# Patient Record
Sex: FEMALE | Race: WHITE
[De-identification: names, ages, dates, MRNs, and addresses within clinical notes are randomized per-mention and may not be internally consistent; named-entity substitution may affect disease eponyms.]

---

## 2019-03-22 ENCOUNTER — HOSPITAL ENCOUNTER (EMERGENCY)
Dept: HOSPITAL 91 - FTE | Age: 19
Discharge: ANOTHER HOSPITAL | End: 2019-03-22
Payer: COMMERCIAL

## 2019-03-22 ENCOUNTER — HOSPITAL ENCOUNTER (EMERGENCY)
Dept: HOSPITAL 10 - FTE | Age: 19
Discharge: ANOTHER HOSPITAL | End: 2019-03-22
Payer: COMMERCIAL

## 2019-03-22 VITALS
WEIGHT: 157.41 LBS | BODY MASS INDEX: 25.3 KG/M2 | HEIGHT: 66 IN | WEIGHT: 157.41 LBS | HEIGHT: 66 IN | BODY MASS INDEX: 25.3 KG/M2

## 2019-03-22 VITALS — SYSTOLIC BLOOD PRESSURE: 113 MMHG | RESPIRATION RATE: 18 BRPM | HEART RATE: 90 BPM | DIASTOLIC BLOOD PRESSURE: 71 MMHG

## 2019-03-22 DIAGNOSIS — K37: Primary | ICD-10-CM

## 2019-03-22 LAB
ADD MAN DIFF?: NO
ADD UMIC: YES
ALANINE AMINOTRANSFERASE: < 6 IU/L (ref 13–69)
ALBUMIN/GLOBULIN RATIO: 1.43
ALBUMIN: 4.3 G/DL (ref 3.3–4.9)
ALKALINE PHOSPHATASE: 67 IU/L (ref 42–121)
AMYLASE: 63 U/L (ref 11–123)
ANION GAP: 14 (ref 5–13)
ASPARTATE AMINO TRANSFERASE: 13 IU/L (ref 15–46)
BASOPHIL #: 0 10^3/UL (ref 0–0.1)
BASOPHILS %: 0.3 % (ref 0–2)
BILIRUBIN,DIRECT: 0 MG/DL (ref 0–0.2)
BILIRUBIN,TOTAL: 0.5 MG/DL (ref 0.2–1.3)
BLOOD UREA NITROGEN: 12 MG/DL (ref 7–20)
CALCIUM: 9 MG/DL (ref 8.4–10.2)
CARBON DIOXIDE: 21 MMOL/L (ref 21–31)
CHLORIDE: 105 MMOL/L (ref 97–110)
CREATININE: 0.64 MG/DL (ref 0.44–1)
EOSINOPHILS #: 0 10^3/UL (ref 0–0.5)
EOSINOPHILS %: 0.2 % (ref 0–7)
GLOBULIN: 3 G/DL (ref 1.3–3.2)
GLUCOSE: 98 MG/DL (ref 70–220)
HEMATOCRIT: 34.5 % (ref 37–47)
HEMOGLOBIN: 10.7 G/DL (ref 12–16)
IMMATURE GRANS #M: 0.06 10^3/UL (ref 0–0.03)
IMMATURE GRANS % (M): 0.4 % (ref 0–0.43)
LIPASE: 95 U/L (ref 23–300)
LYMPHOCYTES #: 1.5 10^3/UL (ref 0.8–2.9)
LYMPHOCYTES %: 9.9 % (ref 18–55)
MEAN CORPUSCULAR HEMOGLOBIN: 25.6 PG (ref 29–33)
MEAN CORPUSCULAR HGB CONC: 31 G/DL (ref 32–37)
MEAN CORPUSCULAR VOLUME: 82.5 FL (ref 72–104)
MEAN PLATELET VOLUME: 10.5 FL (ref 7.4–10.4)
MONOCYTE #: 1.5 10^3/UL (ref 0.3–0.9)
MONOCYTES %: 9.7 % (ref 0–13)
NEUTROPHIL #: 12 10^3/UL (ref 1.6–7.5)
NEUTROPHILS %: 79.5 % (ref 30–74)
NUCLEATED RED BLOOD CELLS #: 0 10^3/UL (ref 0–0)
NUCLEATED RED BLOOD CELLS%: 0 /100WBC (ref 0–0)
PLATELET COUNT: 232 10^3/UL (ref 140–415)
POTASSIUM: 3.9 MMOL/L (ref 3.5–5.1)
RED BLOOD COUNT: 4.18 10^6/UL (ref 4.2–5.4)
RED CELL DISTRIBUTION WIDTH: 14.1 % (ref 11.5–14.5)
SODIUM: 140 MMOL/L (ref 135–144)
TOTAL PROTEIN: 7.3 G/DL (ref 6.1–8.1)
UR ASCORBIC ACID: NEGATIVE MG/DL
UR BACTERIA: (no result) /HPF
UR BILIRUBIN (DIP): NEGATIVE MG/DL
UR BLOOD (DIP): NEGATIVE MG/DL
UR CLARITY: (no result)
UR COLOR: YELLOW
UR GLUCOSE (DIP): NEGATIVE MG/DL
UR KETONES (DIP): (no result) MG/DL
UR LEUKOCYTE ESTERASE (DIP): (no result) LEU/UL
UR MUCUS: (no result) /HPF
UR NITRITE (DIP): NEGATIVE MG/DL
UR PH (DIP): 5 (ref 5–9)
UR RBC: 4 /HPF (ref 0–5)
UR SPECIFIC GRAVITY (DIP): 1.03 (ref 1–1.03)
UR SQUAMOUS EPITHELIAL CELL: (no result) /HPF
UR TOTAL PROTEIN (DIP): NEGATIVE MG/DL
UR UROBILINOGEN (DIP): NEGATIVE MG/DL
UR WBC: 6 /HPF (ref 0–5)
WHITE BLOOD COUNT: 15.1 10^3/UL (ref 4.8–10.8)

## 2019-03-22 PROCEDURE — 83690 ASSAY OF LIPASE: CPT

## 2019-03-22 PROCEDURE — 85025 COMPLETE CBC W/AUTO DIFF WBC: CPT

## 2019-03-22 PROCEDURE — 96365 THER/PROPH/DIAG IV INF INIT: CPT

## 2019-03-22 PROCEDURE — 81001 URINALYSIS AUTO W/SCOPE: CPT

## 2019-03-22 PROCEDURE — 74177 CT ABD & PELVIS W/CONTRAST: CPT

## 2019-03-22 PROCEDURE — 87400 INFLUENZA A/B EACH AG IA: CPT

## 2019-03-22 PROCEDURE — 96376 TX/PRO/DX INJ SAME DRUG ADON: CPT

## 2019-03-22 PROCEDURE — 96375 TX/PRO/DX INJ NEW DRUG ADDON: CPT

## 2019-03-22 PROCEDURE — 99285 EMERGENCY DEPT VISIT HI MDM: CPT

## 2019-03-22 PROCEDURE — 80053 COMPREHEN METABOLIC PANEL: CPT

## 2019-03-22 PROCEDURE — 82150 ASSAY OF AMYLASE: CPT

## 2019-03-22 PROCEDURE — 81025 URINE PREGNANCY TEST: CPT

## 2019-03-22 PROCEDURE — 87086 URINE CULTURE/COLONY COUNT: CPT

## 2019-03-22 RX ADMIN — PIPERACILLIN SODIUM AND TAZOBACTAM SODIUM 1 MLS/HR: 3; .375 INJECTION, POWDER, LYOPHILIZED, FOR SOLUTION INTRAVENOUS at 07:54

## 2019-03-22 RX ADMIN — IOHEXOL 1 ML: 300 INJECTION, SOLUTION INTRAVENOUS at 07:20

## 2019-03-22 RX ADMIN — ONDANSETRON HYDROCHLORIDE 1 MG: 2 INJECTION, SOLUTION INTRAMUSCULAR; INTRAVENOUS at 06:29

## 2019-03-22 RX ADMIN — VASOPRESSIN 1 ML/10 SEC: 20 INJECTION, SOLUTION INTRAMUSCULAR; SUBCUTANEOUS at 07:20

## 2019-03-22 RX ADMIN — SODIUM CHLORIDE 1 ML: 9 INJECTION, SOLUTION INTRAMUSCULAR; INTRAVENOUS; SUBCUTANEOUS at 07:18

## 2019-03-22 NOTE — ERD
ER Documentation


Chief Complaint


Chief Complaint





C/O RLQ AP W/ NAUSEA SINCE 1AM





HPI


This is a 18-year-old female presents to the emergency department with 


complaints of right lower abdominal pain that started at 1 AM.  Stated that this


woke her up.  Pain was described as sharp.  Also complains of nausea without 


vomiting.  Also complains of difficulty walking due to pain.  Her last bowel 


movement was yesterday and it was normal.





LMP: 3/5/2019.  .





Denies headache, head injury, loss of consciousness, dizziness, neck pain, neck 


stiffness, throat pain, difficulty swallowing, difficulty breathing lying flat, 


shoulder pain, chest pain, back pain, vomiting, constipation, diarrhea, urinary 


symptoms, pregnancy or possibility being pregnant, loss of bowel and bladder 


control, trauma, injury, falls, difficulty walking due to pain, numbness or 


tingling sensation, calf pain, recent travel, recent major surgery in the last 3


weeks, calf pain, recent long travel, recent exposure to any illness, recent 


antibiotic use in the last 3 months, fever, chills, seizures.





Past medical history:


Surgical history:


Social: Denies smoking, use of alcoholic beverages, use of illegal drugs.





ROS


All systems reviewed and are negative except as per history of present illness.





Allergies


Allergies:  


Coded Allergies:  


     No Known Allergy (Unverified , 3/22/19)





Physical Exam


Vitals





Vital Signs


  Date      Temp  Pulse  Resp  B/P (MAP)   Pulse Ox  O2          O2 Flow    FiO2


Time                                                 Delivery    Rate


   3/22/19  98.3     90    18      113/71       100  Room Air


     09:56                           (85)





Physical Exam


Const:   No acute distress


Head:   Atraumatic 


Eyes:    Normal Conjunctiva


ENT:    Normal External Ears, Nose and Mouth.


Neck:               Full range of motion. No meningismus.


Resp:   Clear to auscultation bilaterally


Cardio:   Regular rate and rhythm, no murmurs


Abd:    Soft, non tender, non distended. Normal bowel sounds.  Negative Cruz 


sign.  Has right lower abdominal tenderness.  Has rebound tenderness to the 


right lower quadrant.  Positive psoas sign.  Developed right lower abdominal 


pain after jumping twice.  No CVA tenderness.  Difficulty walking due to pain.


Skin:   No petechiae or rashes.  No skin tenting.  No signs of severe 


dehydration.


Back:   No midline or flank tenderness


Ext:    No cyanosis, or edema


Neur:   Awake and alert.  No neurological deficits.


Psych:    Normal Mood and Affect


Result Diagram:  


3/22/19 0600                                                                    


           3/22/19 0600





Results 24 hrs





Laboratory Tests


      Test
                                  3/22/19
06:00  3/22/19
06:24


      White Blood Count                      15.1 10^3/ul


      Red Blood Count                        4.18 10^6/ul


      Hemoglobin                                10.7 g/dl


      Hematocrit                                   34.5 %


      Mean Corpuscular Volume                     82.5 fl


      Mean Corpuscular Hemoglobin                 25.6 pg


      Mean Corpuscular Hemoglobin
Concent      31.0 g/dl 
  



      Red Cell Distribution Width                  14.1 %


      Platelet Count                          232 10^3/UL


      Mean Platelet Volume                        10.5 fl


      Immature Granulocytes %                     0.400 %


      Neutrophils %                                79.5 %


      Lymphocytes %                                 9.9 %


      Monocytes %                                   9.7 %


      Eosinophils %                                 0.2 %


      Basophils %                                   0.3 %


      Nucleated Red Blood Cells %             0.0 /100WBC


      Immature Granulocytes #               0.060 10^3/ul


      Neutrophils #                          12.0 10^3/ul


      Lymphocytes #                           1.5 10^3/ul


      Monocytes #                             1.5 10^3/ul


      Eosinophils #                           0.0 10^3/ul


      Basophils #                             0.0 10^3/ul


      Nucleated Red Blood Cells #             0.0 10^3/ul


      Urine Color                          YELLOW


      Urine Clarity                        CLOUDY


      Urine pH                                        5.0


      Urine Specific Gravity                        1.028


      Urine Ketones                              1+ mg/dL


      Urine Nitrite                        NEGATIVE mg/dL


      Urine Bilirubin                      NEGATIVE mg/dL


      Urine Urobilinogen                   NEGATIVE mg/dL


      Urine Leukocyte Esterase                  1+ Rubina/ul


      Urine Microscopic RBC                        4 /HPF


      Urine Microscopic WBC                        6 /HPF


      Urine Squamous Epithelial
Cells      MANY /HPF 
      



      Urine Bacteria                       FEW /HPF


      Urine Mucus                          FEW /HPF


      Urine Hemoglobin                     NEGATIVE mg/dL


      Urine Glucose                        NEGATIVE mg/dL


      Urine Total Protein                  NEGATIVE mg/dl


      Sodium Level                             140 mmol/L


      Potassium Level                          3.9 mmol/L


      Chloride Level                           105 mmol/L


      Carbon Dioxide Level                      21 mmol/L


      Anion Gap                                        14


      Blood Urea Nitrogen                        12 mg/dl


      Creatinine                               0.64 mg/dl


      Est Glomerular Filtrat Rate
mL/min   > 60 mL/min 
    



      Glucose Level                              98 mg/dl


      Calcium Level                             9.0 mg/dl


      Total Bilirubin                           0.5 mg/dl


      Direct Bilirubin                         0.00 mg/dl


      Indirect Bilirubin                        0.5 mg/dl


      Aspartate Amino Transf
(AST/SGOT)          13 IU/L 
  



      Alanine Aminotransferase
(ALT/SGPT)  < 6 IU/L 
       



      Alkaline Phosphatase                        67 IU/L


      Total Protein                              7.3 g/dl


      Albumin                                    4.3 g/dl


      Globulin                                  3.00 g/dl


      Albumin/Globulin Ratio                         1.43


      Amylase Level                                63 U/L


      Lipase                                       95 U/L


      POC Beta HCG, Qualitative                             NEGATIVE





Current Medications


 Medications
   Dose
          Sig/Theron
       Start Time
   Status  Last


 (Trade)       Ordered        Route
 PRN     Stop Time              Admin
Dose


                              Reason                                Admin


 Morphine       4 mg           ONCE  STAT
    3/22/19       DC           3/22/19


Sulfate
                      IV
            05:33
                       06:29



(morphine)                                   3/22/19 05:37


 Ondansetron    4 mg           ONCE  STAT
    3/22/19       DC           3/22/19


HCl
  (Zofran                 IV
            05:33
                       06:29



Inj)                                         3/22/19 05:37


 IV Flush
      10 ml          STK-MED        3/22/19       DC           3/22/19


(NS 10 ml)                    ONCE
 .ROUTE
  06:48
                       07:18



                                             3/22/19 06:49


 Sodium         100 ml @ ud    STK-MED        3/22/19       DC           3/22/19


Chloride                      ONCE
 .ROUTE
  06:48
                       07:20



                                             3/22/19 06:49


 Iohexol
       150 ml         STK-MED        3/22/19       DC           3/22/19


(Omnipaque                    ONCE
 .ROUTE
  06:48
                       07:20



300mg/
 ml)                                  3/22/19 06:49


 Piperacillin   100 ml @ 
     ONCE  ONCE
    3/22/19       DC           3/22/19


Sod/
          200 mls/hr     IVPB
          08:00
                       07:54



Tazobactam                                   3/22/19 08:29


Sod


 Morphine       4 mg           ONCE  STAT
    3/22/19       DC           3/22/19


Sulfate
                      IV
            11:03
                       11:06



(morphine)                                   3/22/19 11:04








Procedures/MDM


Diagnostic tests:


POC urine pregnancy:


Urinalysis:


Culture urine: Sent.


Blood works:


Influenza a and B:


CT of the abdomen and pelvis with IV contrast:





Treatment: Saline lock.  Normal saline IV bolus.  Morphine IV.  Zofran IV.





Re-evaluation:





Differential diagnosis





This case was endorsed with Leesa Del Valle PA-C who agreed to continue care.


                            DIAGNOSTIC IMAGING REPORT





Patient: RONDA CALDERÓN   : 2000   Age: 18  Sex: F               


        


       MR #:    [id number]   Acct #:   [id number]    DOS: 19 0533


Ordering MD: MADELEINE SCOTT NP   Location:  [facility]   Room/Bed:                  


                         


                                        


PROCEDURE:   CT Abdomen and Pelvis with contrast. 


 


CLINICAL INDICATION:      


 


TECHNIQUE:   CT scan of the abdomen and pelvis with contrast was performed on a 


multi-detector high-resolution CT scanner.  The patient was scanned following 


intravenous administration of 90 mL Isovue-370 . Coronal and sagittal reforma


tted images obtained from the axial source images. Images were reviewed on a 


high-resolution PACS workstation. 


 


Exam CTDI  mGy


Exam DLP   mGy-cm


 


DICOM images are available.


One or more of the following dose reduction techniques were utilized:


1.) Automated exposure control


2.) Adjustment of the mA +/- kV according to patient's size


3.) Use of iterative reconstruction technique.


 


COMPARISON:   None. 


 


FINDINGS:


 


CT abdomen:


 


LOWER THORAX: Lung bases are clear.


 


LIVER AND GALLBLADDER: Normal.


 


SPLEEN: Normal.


 


PANCREAS: Normal.


 


ADRENAL GLANDS: Normal.


 


KIDNEYS: The kidneys enhance symmetrically. There is focal cortical scarring at 


the upper poles of both kidneys. No hydronephrosis, no evidence of an 


obstructing stone in either ureter.


 


VASCULATURE: Negative for aortic aneurysm or dissection.


 


LYMPH NODES: No significant retroperitoneal or mesenteric lymphadenopathy.


 


BOWEL AND MESENTERY: The distal appendix is filled with fluid and dilated to a 


diameter of 13 mm. There is associated fat stranding along the margins of the 


appendix. No extraluminal gas or evidence of an abscess. Several mildly 


prominent reactive right lower quadrant lymph nodes. Stomach and small bowel are


unremarkable.


 


 


CT pelvis:


 


The urinary bladder is unremarkable. Uterus and adnexal structures appear normal


for age. There is a small amount of free fluid in the pelvis.


 


Bones: Regional bones and superficial soft tissues are grossly unremarkable for 


age.


 


 


IMPRESSION:


 


1. Above findings are consistent with acute appendicitis. No evidence of 


perforation.


2. Focal cortical scarring at the upper poles of both kidneys.


 





ER Course: Dr. Temple will help facilitate admission. Zosyn given in ED. Pain is


controlled.  Patient is stable at the time of admission.  She is aware of 


diagnosis.





MDM: 18-year-old female presenting with abdominal pain.  This is a passed on 


from KRYSTLE Hughes.  Patient has findings consistent with acute appendicitis.  She 


will be admitted for surgical intervention.  Patient stable at time of 


admission.





Departure


Diagnosis:  


   Primary Impression:  


   Abdominal pain


   Additional Impression:  


   Appendicitis


Condition:  Fair











MADELEINE SCOTT               Mar 22, 2019 05:33


JORGE DEL VALLE PA-C       Mar 22, 2019 07:48